# Patient Record
Sex: FEMALE | Race: WHITE | Employment: FULL TIME | URBAN - METROPOLITAN AREA
[De-identification: names, ages, dates, MRNs, and addresses within clinical notes are randomized per-mention and may not be internally consistent; named-entity substitution may affect disease eponyms.]

---

## 2019-01-17 ENCOUNTER — OFFICE VISIT (OUTPATIENT)
Dept: FAMILY MEDICINE CLINIC | Facility: CLINIC | Age: 24
End: 2019-01-17
Payer: COMMERCIAL

## 2019-01-17 VITALS
HEIGHT: 57 IN | OXYGEN SATURATION: 99 % | SYSTOLIC BLOOD PRESSURE: 114 MMHG | WEIGHT: 102 LBS | BODY MASS INDEX: 22.01 KG/M2 | HEART RATE: 72 BPM | TEMPERATURE: 99 F | RESPIRATION RATE: 16 BRPM | DIASTOLIC BLOOD PRESSURE: 70 MMHG

## 2019-01-17 DIAGNOSIS — R09.82 PND (POST-NASAL DRIP): ICD-10-CM

## 2019-01-17 DIAGNOSIS — H65.92 LEFT OTITIS MEDIA WITH EFFUSION: Primary | ICD-10-CM

## 2019-01-17 DIAGNOSIS — Z76.89 ENCOUNTER TO ESTABLISH CARE: ICD-10-CM

## 2019-01-17 PROCEDURE — 99203 OFFICE O/P NEW LOW 30 MIN: CPT | Performed by: FAMILY MEDICINE

## 2019-01-17 RX ORDER — AMOXICILLIN 500 MG/1
500 CAPSULE ORAL 2 TIMES DAILY
Qty: 20 CAPSULE | Refills: 0 | Status: SHIPPED | OUTPATIENT
Start: 2019-01-17 | End: 2019-01-27

## 2019-01-17 RX ORDER — FLUTICASONE PROPIONATE 50 MCG
1 SPRAY, SUSPENSION (ML) NASAL DAILY
Qty: 16 G | Refills: 0 | Status: SHIPPED | OUTPATIENT
Start: 2019-01-17 | End: 2019-02-16

## 2019-01-17 NOTE — PROGRESS NOTES
HPI:    Patient ID: Livier Brewster is a 24year old female. Patient presents with:  Ear Pain: started monday night , pain is in R ear and yesterday L ear started. Ear Pain    There is pain in both ears. This is a new problem.  The problem occurs c Hearing, external ear and ear canal normal. No drainage. No mastoid tenderness. No decreased hearing is noted. Left Ear: Hearing and external ear normal. No drainage, swelling or tenderness. Tympanic membrane is injected, erythematous and retracted.  A mi Fluticasone Propionate 50 MCG/ACT Nasal Suspension 16 g 0     Si spray by Each Nare route daily.        Imaging & Referrals:  None       IW#[id number]

## 2019-01-24 ENCOUNTER — TELEPHONE (OUTPATIENT)
Dept: FAMILY MEDICINE CLINIC | Facility: CLINIC | Age: 24
End: 2019-01-24

## 2019-01-24 NOTE — TELEPHONE ENCOUNTER
Dr Maggie Rivers- Pt states employer will not need form completed for work.   Disregard request.  Didier Chavez was dropped off this am.

## 2019-01-24 NOTE — TELEPHONE ENCOUNTER
Pt came in to drop off her \"Assessment for Restricted Return to [place]" form. Pt was sick and miss work and needs MD to fill out form. I let pt know there may be a fee for the form. She would like a phone call upon completion for .  Please let her

## 2019-01-25 ENCOUNTER — TELEPHONE (OUTPATIENT)
Dept: FAMILY MEDICINE CLINIC | Facility: CLINIC | Age: 24
End: 2019-01-25

## 2019-01-25 NOTE — TELEPHONE ENCOUNTER
PT is requesting a note for work to return on 1/28/19    Letter pended   LOV:1/17/19  ASSESSMENT/PLAN:   Left otitis media with effusion  (primary encounter diagnosis)  Pnd (post-nasal drip)  Encounter to establish care  Medications as per below.   -advise

## 2020-02-18 ENCOUNTER — OFFICE VISIT (OUTPATIENT)
Dept: FAMILY MEDICINE CLINIC | Facility: CLINIC | Age: 25
End: 2020-02-18
Payer: COMMERCIAL

## 2020-02-18 VITALS
WEIGHT: 109 LBS | DIASTOLIC BLOOD PRESSURE: 70 MMHG | TEMPERATURE: 99 F | HEART RATE: 72 BPM | HEIGHT: 57 IN | RESPIRATION RATE: 16 BRPM | SYSTOLIC BLOOD PRESSURE: 112 MMHG | BODY MASS INDEX: 23.51 KG/M2

## 2020-02-18 DIAGNOSIS — R68.89 FLU-LIKE SYMPTOMS: Primary | ICD-10-CM

## 2020-02-18 LAB
FLUAV + FLUBV RNA SPEC NAA+PROBE: NEGATIVE
FLUAV + FLUBV RNA SPEC NAA+PROBE: NEGATIVE
FLUAV + FLUBV RNA SPEC NAA+PROBE: POSITIVE

## 2020-02-18 PROCEDURE — 99213 OFFICE O/P EST LOW 20 MIN: CPT | Performed by: FAMILY MEDICINE

## 2020-02-18 PROCEDURE — 87502 INFLUENZA DNA AMP PROBE: CPT | Performed by: FAMILY MEDICINE

## 2020-02-18 PROCEDURE — 87798 DETECT AGENT NOS DNA AMP: CPT | Performed by: FAMILY MEDICINE

## 2020-02-18 NOTE — PROGRESS NOTES
109HPI:   Declan Lindsay is a 21year old female that presents for ear pain, coughing, nasal congestion, body aches, fever Tmax 101F. Ongoing for 3-4 days, but got acutely worse yesterday when fever started. Using nyquil/dayquil, theraflu.   +sick cont bilterally, no rales/rhonchi/wheezing. ABDOMEN:  Soft, nondistended, nontender, bowel sounds normal in all 4 quadrants, no masses, no hepatosplenomegaly. EXTREMITIES:  No edema, no cyanosis, 2+ radial pulses b/l.    NEURO:  Grossly normal     ASSESSMENT A

## 2020-02-18 NOTE — PATIENT INSTRUCTIONS
Cough: mucinex DM twice a day (guifeniasin and dextromethorphan) . Take with a full glass of water. Nasal Congestion: Flonase nasal spray daily. Can add decongestant (sudaphed) if needed and NO history of high blood pressure.   AVOID nasal sprays like without caffeine, juices, tea, and soup. Extra fluids will also help loosen secretions in your nose and lungs. · Over-the-counter cold medicines will not make the flu go away faster.  But the medicines may help with coughing, sore throat, and congestion in

## 2020-02-19 RX ORDER — OSELTAMIVIR PHOSPHATE 75 MG/1
75 CAPSULE ORAL 2 TIMES DAILY
Qty: 10 CAPSULE | Refills: 0 | Status: SHIPPED | OUTPATIENT
Start: 2020-02-19 | End: 2020-02-24

## 2020-02-21 ENCOUNTER — TELEPHONE (OUTPATIENT)
Dept: FAMILY MEDICINE CLINIC | Facility: CLINIC | Age: 25
End: 2020-02-21

## 2020-02-21 NOTE — TELEPHONE ENCOUNTER
Spoke with pt, informed note is ready for . Given to . Pt verbalized understanding and agreement. Task completed.

## 2020-02-21 NOTE — TELEPHONE ENCOUNTER
Pt is requesting work note to return to work be changed to 02/24/2020 as she has taken the week off. Please call with any questions. Ph. [phone]    Please call when patient can  note.

## 2020-02-21 NOTE — TELEPHONE ENCOUNTER
Pt requesting work note be extended to go back on 2/24/2020    Last OV 2/18/2020 - Dr. Kiah Hudson    Note pended.

## 2022-08-15 NOTE — PATIENT INSTRUCTIONS
Counseling Services/Psychiatry    [facility]  Psychiatry Dr. Magda Mitchell or Dr. Merlin Flash  Phone: [phone]     The [place] [address] [address]   [phone]  [email or web address]/[facility]-clin[facility]    Mateus Huston and Carley Abel   [phone]  [email or web address]/    [facility]   [phone]   [email or web address] [email or web address]    [facility]  [phone]  [email or web address]. [email or web address]/     National Suicide Prevention Lifeline  Hours: Available 24 hours. Languages: [place], [place]  Learn more  [phone]

## 2022-08-16 ENCOUNTER — TELEPHONE (OUTPATIENT)
Dept: FAMILY MEDICINE CLINIC | Facility: CLINIC | Age: 27
End: 2022-08-16

## 2022-08-16 NOTE — TELEPHONE ENCOUNTER
Received incoming fax from [employer] requesting FMLA for patient. Will place in doctor bin for review.

## 2022-08-17 ENCOUNTER — PATIENT MESSAGE (OUTPATIENT)
Dept: FAMILY MEDICINE CLINIC | Facility: CLINIC | Age: 27
End: 2022-08-17

## 2022-08-18 NOTE — TELEPHONE ENCOUNTER
From: Dedrick Pereira  To: Lola Sotelo  Sent: 8/17/2022 11:14 AM CDT  Subject: fmla paperwork    Dc St,     Just wanted to let you know Dr. Warden Farrell finished your fmla paperwork. Did you want us to fax it, or pick it up, or would you like a copy of it. Let us know what works best for you.      Thank you,    Dr. Isabel Rawls staff

## 2022-08-31 ENCOUNTER — PATIENT MESSAGE (OUTPATIENT)
Dept: FAMILY MEDICINE CLINIC | Facility: CLINIC | Age: 27
End: 2022-08-31

## 2022-10-12 ENCOUNTER — OFFICE VISIT (OUTPATIENT)
Dept: FAMILY MEDICINE CLINIC | Facility: CLINIC | Age: 27
End: 2022-10-12
Payer: COMMERCIAL

## 2022-10-12 VITALS
RESPIRATION RATE: 14 BRPM | HEART RATE: 87 BPM | SYSTOLIC BLOOD PRESSURE: 102 MMHG | HEIGHT: 57 IN | WEIGHT: 110 LBS | TEMPERATURE: 98 F | DIASTOLIC BLOOD PRESSURE: 68 MMHG | BODY MASS INDEX: 23.73 KG/M2 | OXYGEN SATURATION: 99 %

## 2022-10-12 DIAGNOSIS — J30.2 SEASONAL ALLERGIES: Primary | ICD-10-CM

## 2022-10-12 PROCEDURE — 3008F BODY MASS INDEX DOCD: CPT | Performed by: FAMILY MEDICINE

## 2022-10-12 PROCEDURE — 99213 OFFICE O/P EST LOW 20 MIN: CPT | Performed by: FAMILY MEDICINE

## 2022-10-12 PROCEDURE — 3074F SYST BP LT 130 MM HG: CPT | Performed by: FAMILY MEDICINE

## 2022-10-12 PROCEDURE — 3078F DIAST BP <80 MM HG: CPT | Performed by: FAMILY MEDICINE

## 2022-10-12 RX ORDER — FLUTICASONE PROPIONATE 50 MCG
2 SPRAY, SUSPENSION (ML) NASAL DAILY
Qty: 9.9 ML | Refills: 0 | Status: SHIPPED | OUTPATIENT
Start: 2022-10-12 | End: 2023-10-07

## 2022-10-12 RX ORDER — LORATADINE 10 MG/1
10 CAPSULE, LIQUID FILLED ORAL DAILY
Qty: 30 CAPSULE | Refills: 0 | Status: SHIPPED | OUTPATIENT
Start: 2022-10-12 | End: 2022-11-11

## 2022-10-18 ENCOUNTER — PATIENT MESSAGE (OUTPATIENT)
Dept: FAMILY MEDICINE CLINIC | Facility: CLINIC | Age: 27
End: 2022-10-18

## 2022-11-01 ENCOUNTER — PATIENT MESSAGE (OUTPATIENT)
Dept: FAMILY MEDICINE CLINIC | Facility: CLINIC | Age: 27
End: 2022-11-01

## 2022-11-01 NOTE — TELEPHONE ENCOUNTER
From: Raman Perales  To: Lanre Saha DO  Sent: 11/1/2022 1:52 PM CDT  Subject: FMAL paperwork     Hi, I had my Employer Resend you guys my paperwork that needs to be filled out. Could you guys please fax that back over to them as soon as possible.  Thank you

## 2022-11-17 ENCOUNTER — TELEPHONE (OUTPATIENT)
Dept: FAMILY MEDICINE CLINIC | Facility: CLINIC | Age: 27
End: 2022-11-17

## 2022-11-17 NOTE — TELEPHONE ENCOUNTER
Patient calling to f/up on FMLA forms, there was a second request, did we receive that one and send it back? I see line 1 has a date on it. Is this final? Please advise.

## 2022-11-18 NOTE — TELEPHONE ENCOUNTER
I already did it couple weeks ago. If there is anything that needs to be fixed please place on my desk.

## 2022-11-18 NOTE — TELEPHONE ENCOUNTER
Addended [facility] paperwork faxed to  [facility] and [facility], [facility], [phone], confirmation received.

## 2022-11-18 NOTE — TELEPHONE ENCOUNTER
Calling to give clarification on the dates on the ppw:  Line 1 -was the date that she started the fmla-10-14-22    Line 2-date that recd treatment 10-18-22

## 2022-11-18 NOTE — TELEPHONE ENCOUNTER
[facility]    [employer] message also sent to pt. Copy of updated paperwork in triage accordion folder.

## 2023-04-13 ENCOUNTER — PATIENT MESSAGE (OUTPATIENT)
Dept: FAMILY MEDICINE CLINIC | Facility: CLINIC | Age: 28
End: 2023-04-13

## 2023-04-13 NOTE — TELEPHONE ENCOUNTER
LMOM for pt to call-will speak to her prior to faxing and find out if she would like copy as well. Placed in work in progress bin.

## 2023-04-13 NOTE — TELEPHONE ENCOUNTER
From: Kathrine Alert  To: Jc Stroud, DO  Sent: 4/13/2023 10:39 AM CDT  Subject: FMLA paperwork     Hi, I just wanted to see if you guys have received my FMLA paperwork, I spoke to Joe and I believe they sent it over Friday.

## 2023-04-14 NOTE — TELEPHONE ENCOUNTER
LMOM for pt that ppw will be faxed to [facility] at [phone]. Spoke to pt-copy will  be mailed to pts home, copy sent to scan, and copy placed in blue accordion folder at .

## 2023-10-10 ENCOUNTER — TELEPHONE (OUTPATIENT)
Dept: FAMILY MEDICINE CLINIC | Facility: CLINIC | Age: 28
End: 2023-10-10

## 2023-10-17 ENCOUNTER — TELEPHONE (OUTPATIENT)
Dept: FAMILY MEDICINE CLINIC | Facility: CLINIC | Age: 28
End: 2023-10-17

## 2023-10-17 NOTE — TELEPHONE ENCOUNTER
Called patient to inform FMLA forms faxed to [facility]. Received fax confirmation, patient requesting copy of forms to be sent to her via mail. Copies mailed to address on file.

## 2023-11-30 DIAGNOSIS — F33.1 MODERATE EPISODE OF RECURRENT MAJOR DEPRESSIVE DISORDER (HCC): ICD-10-CM

## 2023-12-04 NOTE — TELEPHONE ENCOUNTER
Requesting Bupropion 150mg  LOV: 10/10/23  RTC: 1 month  Last Relevant Labs:   Filled: 10/10/23 #30 with 0 refills    No future appointments. Patient was to return to clinic in 1 month for follow up on medication. Please schedule follow up.

## 2023-12-06 RX ORDER — BUPROPION HYDROCHLORIDE 150 MG/1
150 TABLET ORAL DAILY
Qty: 30 TABLET | Refills: 0 | OUTPATIENT
Start: 2023-12-06

## 2024-08-23 ENCOUNTER — TELEPHONE (OUTPATIENT)
Dept: FAMILY MEDICINE CLINIC | Facility: CLINIC | Age: 29
End: 2024-08-23

## 2024-08-23 NOTE — TELEPHONE ENCOUNTER
Called patient, LEFT MESSAGE ON MACHINE patient needs appointment for forms completion. Also sent [employer] message informing of above, will place paperwork in WIP.

## 2024-09-05 ENCOUNTER — TELEPHONE (OUTPATIENT)
Dept: FAMILY MEDICINE CLINIC | Facility: CLINIC | Age: 29
End: 2024-09-05

## 2024-09-05 NOTE — TELEPHONE ENCOUNTER
Patient called with details.      Type of Leave:  Recertification/Family Medical Leave Act   Reason for Leave:  Anxiety/Depression (all information the same)   Patient requesting 1-3 flare ups /mth , each episode lasting . 1-3 day. No need to task provider. Letter on file. (PATIENT MAY USE CONSECUTIVE DAYS IF NEEDED)  Start date (8/5/24 - 01/05/25)

## 2024-09-05 NOTE — TELEPHONE ENCOUNTER
Patient had an appointment 9/3/24, sending paperwork from [employer] to Forms Dept. No fee collected, no RELEASE OF INFORMATION signed.

## 2024-09-06 NOTE — TELEPHONE ENCOUNTER
Henry Zepeda,    Please sign off on form if you agree to: Family Medical Leave Act due to anxiety/depression; 8/05/24 - 01/05/25    -Signature page will be the first page scanned  -From your Inbasket, Highlight the patient and click Chart   -Double click the 08/23/24 Forms Completion telephone encounter  -Scroll down to the Media section   -Click the blue Hyperlink: KEN Silva 9/06/24  -Click Acknowledge located in the top right ribbon/menu   -Drag the mouse into the blank space of the document and a + sign will appear. Left click to   electronically sign the document.  -Once signed, simply exit out of the screen and you signature will be saved.     Thank you for your time,      Sridevi

## 2025-01-22 ENCOUNTER — OFFICE VISIT (OUTPATIENT)
Dept: FAMILY MEDICINE CLINIC | Facility: CLINIC | Age: 30
End: 2025-01-22
Payer: COMMERCIAL

## 2025-01-22 VITALS
SYSTOLIC BLOOD PRESSURE: 90 MMHG | OXYGEN SATURATION: 100 % | WEIGHT: 119 LBS | BODY MASS INDEX: 23.99 KG/M2 | DIASTOLIC BLOOD PRESSURE: 60 MMHG | RESPIRATION RATE: 16 BRPM | HEART RATE: 78 BPM | TEMPERATURE: 98 F | HEIGHT: 59 IN

## 2025-01-22 DIAGNOSIS — Z00.00 WELL ADULT EXAM: Primary | ICD-10-CM

## 2025-01-22 DIAGNOSIS — G47.9 SLEEPING DIFFICULTY: ICD-10-CM

## 2025-01-22 DIAGNOSIS — F43.29 GRIEF REACTION WITH PROLONGED BEREAVEMENT: ICD-10-CM

## 2025-01-22 RX ORDER — HYDROXYZINE HYDROCHLORIDE 50 MG/1
50 TABLET, FILM COATED ORAL NIGHTLY
Qty: 30 TABLET | Refills: 0 | Status: SHIPPED | OUTPATIENT
Start: 2025-01-22 | End: 2025-02-21

## 2025-01-24 DIAGNOSIS — F43.21 GRIEF REACTION: ICD-10-CM

## 2025-01-24 DIAGNOSIS — F33.1 MODERATE EPISODE OF RECURRENT MAJOR DEPRESSIVE DISORDER (HCC): ICD-10-CM

## 2025-01-27 RX ORDER — HYDROXYZINE HYDROCHLORIDE 25 MG/1
25 TABLET, FILM COATED ORAL NIGHTLY
Qty: 30 TABLET | Refills: 0 | OUTPATIENT
Start: 2025-01-27

## 2025-02-01 NOTE — PROGRESS NOTES
Chief Complaint   Patient presents with    Paperwork     FMLA paper work - patient did not bring in paper work     Medication Follow-Up     Patient states meds rx'd for sleep and anxiety did not work -       HPI:  Alma Rosa Wiggins is a 29 year old female here today for preventative visit.      Past Medical History:    Anxiety    Depression     (normal spontaneous vaginal delivery) ([facility])    x2    Sleep apnea     History reviewed. No pertinent surgical history.  Medications Ordered Prior to Encounter[1]  Allergies[2]  Social History     Socioeconomic History    Marital status: Unknown     Spouse name: Not on file    Number of children: Not on file    Years of education: Not on file    Highest education level: Not on file   Occupational History    Not on file   Tobacco Use    Smoking status: Never     Passive exposure: Never    Smokeless tobacco: Never   Vaping Use    Vaping status: Never Used   Substance and Sexual Activity    Alcohol use: Not Currently    Drug use: Never    Sexual activity: Yes     Partners: Male     Birth control/protection: I.U.D., Condom     Comment: Mirta   Other Topics Concern    Caffeine Concern No    Exercise No    Seat Belt No    Special Diet No    Stress Concern Yes    Weight Concern No   Social History Narrative    Not on file     Social Drivers of Health     Financial Resource Strain: Not on file   Food Insecurity: Not on file   Transportation Needs: Not on file   Physical Activity: Not on file   Stress: Not on file   Social Connections: Not on file   Housing Stability: Not on file     History reviewed. No pertinent family history.    Review of Systems - All systems reviewed and negative except for HPI    PHYSICAL EXAM:  BP 90/60   Pulse 78   Temp 97.5 °F (36.4 °C) (Temporal)   Resp 16   Ht 4' 11\" (1.499 m)   Wt 119 lb (54 kg)   LMP 2024   SpO2 100%   BMI 24.04 kg/m²       ASSESSMENT/PLAN:    1. Well adult exam  - CBC With Differential With Platelet; Future  - Comp  Metabolic Panel (14); Future  - Lipid Panel; Future  - TSH and Free T4 [E]; Future    2. Grief reaction with prolonged bereavement  - OP REFERRAL TO [facility]  - hydrOXYzine 50 MG Oral Tab; Take 1 tablet (50 mg total) by mouth at bedtime.  Dispense: 30 tablet; Refill: 0    3. Sleeping difficulty  - hydrOXYzine 50 MG Oral Tab; Take 1 tablet (50 mg total) by mouth at bedtime.  Dispense: 30 tablet; Refill: 0        Patient verbalized understanding and agrees to plan.      No follow-ups on file.    Pt seen by Duane MA who is not able to finish documentation. Chart finished with information available.         [1]   No current outpatient medications on file prior to visit.     No current facility-administered medications on file prior to visit.   [2] No Known Allergies

## (undated) NOTE — LETTER
Date: 1/25/2019    Patient Name: Michael Escalera           To Whom it may concern: This letter has been written at the patient's request. The above patient was seen at the [facility] for treatment of a medical condition.     This patient

## (undated) NOTE — LETTER
Date: 8/15/2022    Patient Name: Wendy Mehta          To Whom it may concern: This letter has been written at the patient's request. The above patient was seen at the [facility] for treatment of a medical condition. This patient should be excused from attending work/school from 8/12 through 9/5. The patient may return to work/school on 9/6 with the following limitations: none. Please feel free to contact me with questions or concerns.         Sincerely,        Kate Candelaria, DO

## (undated) NOTE — LETTER
Date: 2/18/2020    Patient Name: Cindy Nascimento          To Whom it may concern: This letter has been written at the patient's request. The above patient was seen at the [facility] for treatment of a medical condition.     This patient s

## (undated) NOTE — LETTER
Date: 2/21/2020    Patient Name: Darvin Trinidad          To Whom it may concern: This letter has been written at the patient's request. The above patient was seen at the [facility] for treatment of a medical condition.     This patient s